# Patient Record
(demographics unavailable — no encounter records)

---

## 2017-12-11 NOTE — PHYS DOC
Past Medical History


Past Medical History:  GERD


Past Surgical History:  Appendectomy, Cholecystectomy, Tonsillectomy, Tubal 

ligation


Alcohol Use:  Occasionally


Drug Use:  None





Adult General


Chief Complaint


Chief Complaint:  UPPER EXTREMITY PAIN





HPI


HPI





Patient is a 42  year old female who presents with right elbow pain. Patient 

states that she does not have any known injury. She does use her elbow at work 

with constant motion. She denies swelling, redness or signs of infection. She 

has taken some ibuprofen and Tylenol but it has not resolved.





Review of Systems


Review of Systems





Constitutional: Denies fever or chills []


Respiratory: Denies cough or shortness of breath []


Cardiovascular: No additional information not addressed in HPI []


Musculoskeletal: Denies back pain or joint pain []


Integument: Denies rash or skin lesions []


Neurologic: Denies headache, focal weakness or sensory changes []


Endocrine: Denies polyuria or polydipsia []





All other systems were reviewed and found to be within normal limits, except as 

documented in this note.





Allergies


Allergies





Allergies








Coded Allergies Type Severity Reaction Last Updated Verified


 


  No Known Drug Allergies    11/30/15 No











Physical Exam


Physical Exam





Constitutional: Well developed, well nourished, no acute distress, non-toxic 

appearance. []


Cardiovascular:Heart rate regular rhythm, no murmur []


Lungs & Thorax:  Bilateral breath sounds clear to auscultation []


Skin: Warm, dry, no erythema, no rash. [] 


Back: No tenderness, no CVA tenderness. [] 


Extremities: Tenderness to right elbow, no cyanosis, no clubbing, ROM slightly 

limited due to pain to extension only, no edema, cyanosis and sensation are 

intact distally. [] 


Neurologic: Alert and oriented X 3, normal motor function, normal sensory 

function, no focal deficits noted. []


Psychologic: Affect normal, judgement normal, mood normal. []





Current Patient Data


Vital Signs





 Vital Signs








  Date Time  Temp Pulse Resp B/P (MAP) Pulse Ox O2 Delivery O2 Flow Rate FiO2


 


17 08:16 97.8 87 18  97 Room Air  





 97.8       











EKG


EKG


[]





Radiology/Procedures


Radiology/Procedures


[]PATIENT: SIGRID ZIMMERMAN ACCOUNT: [id number] MRN#: [id number]


: 1975 LOCATION: ER AGE: 42


SEX: F EXAM DT: 17 ACCESSION#: [id number]


STATUS: REG ER ORD. PHYSICIAN: OLIVERIO SILVERMAN 


REASON: pain x 1 week, no known injury


PROCEDURE: ELBOW RIGHT 3V





Indication: Elbow pain for one week, no known injury.





Technique: 3 views of the right elbow are submitted for review. No comparison


is available.





Findings: There is no fracture or dislocation. There is no displacement of fat


pads/joint effusion. There is no soft tissue swelling.





Impression: Negative for fracture. If further workup is required or if the


concern is for impingement, consider MRI.














DICTATED and SIGNED BY:     TEZ MARTIN MD


DATE:     17 0836





CC: OLIVERIO SILVERMAN; NO PCP; NON,STAFF ~





Course & Med Decision Making


Course & Med Decision Making


Pertinent Labs and Imaging studies reviewed. (See chart for details)





[]1. Elbow pain








Patient is been placed in an Ace wrap. She was given instructions for 

epicondylitis. She has been placed on a prescription for Mobitz. She is to 

follow-up with PCP of her own choosing, return to the ED if worsening.





Dragon Disclaimer


Dragon Disclaimer


This electronic medical record was generated, in whole or in part, using a 

voice recognition dictation system.





Departure


Departure


Impression:  


 Primary Impression:  


 Right elbow pain


Disposition:  01 HOME, SELF-CARE


Condition:  STABLE


Patient Instructions:  Epicondylitis, Lateral (Tennis Elbow) with Rehab-

Sports[facility]





Additional Instructions:  


Please choose a primary care provider from the flyer provided to make an 

appointment for a follow-up in one week. Take the medication as directed. 

Please return to the ED if worsening.


Scripts


Meloxicam (MOBIC) 7.5 Mg Tablet


1 TAB PO DAILY, #30 TAB 1 Refill


   Prov: OLIVERIO SILVERMAN         17











OLIVERIO SILVERMAN Dec 11, 2017 09:16

## 2017-12-11 NOTE — RAD
Indication: Elbow pain for one week, no known injury.



Technique: 3 views of the right elbow are submitted for review. No comparison

is available.



Findings: There is no fracture or dislocation. There is no displacement of fat

pads/joint effusion. There is no soft tissue swelling.



Impression: Negative for fracture. If further workup is required or if the

concern is for impingement, consider MRI.

## 2018-12-13 NOTE — PHYS DOC
Past Medical History


Past Medical History:  GERD


Past Surgical History:  Appendectomy, Cholecystectomy, Tonsillectomy, Tubal 

ligation


Alcohol Use:  Occasionally


Drug Use:  None





Adult General


Chief Complaint


Chief Complaint:  CHEST PAIN





HPI


HPI





Patient is a 43  year old female who presents with chest pain.  Patient has 

been having chest pain over the last 2 weeks. The pain is been constant but has 

waxed and waned in intensity. She states the pain is somewhat improved when she 

lies flat. She has mild shortness of breath associated with the pain. Pain is 

nonradiating. She does not have a personal history of coronary artery disease 

but does have significant family history including in her father, older brother

, and younger brother. The patient has a 10-pack-year history of tobacco abuse. 

She denies prior history of respiratory or lung diagnoses. She came to the 

emergency department today because the pain became more severe and lasted 

longer compared to last 2 weeks.





Review of Systems


Review of Systems





Constitutional: Denies fever or chills []


Eyes: Denies change in visual acuity, redness, or eye pain []


HENT: Denies nasal congestion or sore throat []


Respiratory: Denies cough or shortness of breath []


Cardiovascular: No additional information not addressed in HPI []


GI: Denies abdominal pain, nausea, vomiting, bloody stools or diarrhea []


: Denies dysuria or hematuria []


Musculoskeletal: Denies back pain or joint pain []


Integument: Denies rash or skin lesions []


Neurologic: Denies headache, focal weakness or sensory changes []


Endocrine: Denies polyuria or polydipsia []





All other systems were reviewed and found to be within normal limits, except as 

documented in this note.





Current Medications


Current Medications





Current Medications








 Medications


  (Trade)  Dose


 Ordered  Sig/Alli  Start Time


 Stop Time Status Last Admin


Dose Admin


 


 Albuterol/


 Ipratropium


  (Duoneb)  3 ml  STK-MED ONCE  12/13/18 20:11


 12/13/18 20:13 DC  





 


 Aspirin


  (Children'S


 Aspirin)  324 mg  1X  ONCE  12/13/18 19:00


 12/13/18 19:01 DC 12/13/18 19:06


324 MG


 


 Morphine Sulfate


  (Morphine


 Sulfate)  4 mg  1X  ONCE  12/13/18 19:00


 12/13/18 19:01 DC 12/13/18 19:06


4 MG


 


 Prednisone


  (Prednisone)  60 mg  1X  ONCE  12/13/18 20:15


 12/13/18 20:16 DC 12/13/18 20:18


60 MG











Allergies


Allergies





Allergies








Coded Allergies Type Severity Reaction Last Updated Verified


 


  No Known Drug Allergies    12/13/18 No











Physical Exam


Physical Exam





Constitutional: Well developed, well nourished, no acute distress, non-toxic 

appearance


HENT: Normocephalic, atraumatic, bilateral external ears normal, oropharynx 

moist


Eyes: PERRLA, EOMI, conjunctiva normal 


Neck: Normal range of motion 


Cardiovascular:Heart rate regular rhythm, no murmur


Lungs & Thorax:  Bilateral breath sounds with wheezes bilaterally and mildly 

prolonged expiratory phase


Abdomen: Bowel sounds normal, soft, no tenderness 


Skin: Warm, dry 


Back: No tenderness 


Extremities: No tenderness, no edema 


Neurologic: Alert and oriented X 3


Psychologic: Affect normal





Current Patient Data


Vital Signs





 Vital Signs








  Date Time  Temp Pulse Resp B/P (MAP) Pulse Ox O2 Delivery O2 Flow Rate FiO2


 


12/13/18 20:17     98 Room Air  


 


12/13/18 20:15  64 13 108/51 (70)    


 


12/13/18 18:40 98.5       





 98.5       








Lab Values





 Laboratory Tests








Test


 12/13/18


18:48


 


White Blood Count


 11.7 x10^3/uL


(4.0-11.0)  H


 


Red Blood Count


 4.66 x10^6/uL


(3.50-5.40)


 


Hemoglobin


 13.1 g/dL


(12.0-15.5)


 


Hematocrit


 38.5 %


(36.0-47.0)


 


Mean Corpuscular Volume


 83 fL ()





 


Mean Corpuscular Hemoglobin 28 pg (25-35)  


 


Mean Corpuscular Hemoglobin


Concent 34 g/dL


(31-37)


 


Red Cell Distribution Width


 16.2 %


(11.5-14.5)  H


 


Platelet Count


 251 x10^3/uL


(140-400)


 


Neutrophils (%) (Auto) 69 % (31-73)  


 


Lymphocytes (%) (Auto) 20 % (24-48)  L


 


Monocytes (%) (Auto) 7 % (0-9)  


 


Eosinophils (%) (Auto) 3 % (0-3)  


 


Basophils (%) (Auto) 1 % (0-3)  


 


Neutrophils # (Auto)


 8.1 x10^3uL


(1.8-7.7)  H


 


Lymphocytes # (Auto)


 2.4 x10^3/uL


(1.0-4.8)


 


Monocytes # (Auto)


 0.8 x10^3/uL


(0.0-1.1)


 


Eosinophils # (Auto)


 0.4 x10^3/uL


(0.0-0.7)


 


Basophils # (Auto)


 0.1 x10^3/uL


(0.0-0.2)


 


Prothrombin Time


 13.1 SEC


(11.7-14.0)


 


Prothrombin Time INR 1.0 (0.8-1.1)  


 


PTT


 31 SEC (24-38)





 


D-Dimer (Cierra)


 < 0.27


ug/mlFEU


 


Sodium Level


 140 mmol/L


(136-145)


 


Potassium Level


 3.5 mmol/L


(3.5-5.1)


 


Chloride Level


 101 mmol/L


()


 


Carbon Dioxide Level


 29 mmol/L


(21-32)


 


Anion Gap 10 (6-14)  


 


Blood Urea Nitrogen


 9 mg/dL (7-20)





 


Creatinine


 0.9 mg/dL


(0.6-1.0)


 


Estimated GFR


(Cockcroft-Gault) 68.3  





 


Glucose Level


 215 mg/dL


(70-99)  H


 


Calcium Level


 9.3 mg/dL


(8.5-10.1)


 


Troponin I Quantitative


 < 0.017 ng/mL


(0.000-0.055)


 


NT-Pro-B-Type Natriuretic


Peptide 68 pg/mL


(0-124)





 Laboratory Tests


12/13/18 18:48








 Laboratory Tests


12/13/18 18:48














EKG


EKG


No STEMI


Interpretation Time:


18:45





Radiology/Procedures


Radiology/Procedures


CXR:  No acute findings.





Course & Med Decision Making


Course & Med Decision Making


Pertinent Labs and Imaging studies reviewed. (See chart for details)





Patient is seen and examined immediately on arrival to her room. She is 

currently having mild chest pain symptoms. She is noted to have wheezes in all 

fields during the exam but no increased work of breathing. DuoNeb is ordered 

along with cardiac workup.





HEART:


- slightly suspicious (0)


- EKG normal:  (0)


-  obesity, smoker, family hx, (2)


- troponin normal limit:  (0)





Total score:  2.





20:40:  Patient is re-examined.  Pain sx improved.  Duoneb pending. 





20:50:  Lungs are clear. Patient is feeling improved. Plan is for discharge 

home. She has been having pain for 3 weeks. Her heart score is only 2. She is 

provided the follow-up information for cardiology locally and advised follow-up 

as needed or return to the ER for any new or worsening symptoms.





Dragon Disclaimer


Dragon Disclaimer


This electronic medical record was generated, in whole or in part, using a 

voice recognition dictation system.





Departure


Departure


Disposition:  01 HOME, SELF-CARE


Condition:  GOOD


Referrals:  


NO PCP (PCP)


Scripts


Albuterol Sulfate (PROAIR HFA INHALER) 8.5 Gm Hfa.aer.ad


2 PUFF INH Q4H PRN for SHORTNESS OF BREATH, #1 INHALER 0 Refills


   Prov: CHRIS WESLEY DO         12/13/18 


Prednisone (PREDNISONE) 50 Mg Tablet


1 TAB PO DAILY, #5 TAB


   Prov: CHRIS WESLEY DO         12/13/18











CHRIS WESLEY DO Dec 13, 2018 20:11

## 2018-12-13 NOTE — RAD
PORTABLE CHEST 1V

 

History: Mid sternal chest pain radiating to the right arm and neck, 

hypertension

 

Comparison: None.

 

Findings:

There is no lobar infiltrate, pleural fluid, pneumothorax. Heart size is 

within normal limits given technique.

 

Impression: 

 

1.  No acute radiographic abnormality is identified.

 

Electronically signed by: Seth Hagen MD (12/13/2018 7:09 PM) [facility]

## 2018-12-14 NOTE — EKG
[facility]

              [address] [place] [address]

Test Date:    2018               Test Time:    18:43:15

Pat Name:     SIGRID ZIMMERMAN            Department:   

Patient ID:   [id number]           Room:          

Gender:       F                        Technician:   

:          1975               Requested By: CHRIS WESLEY

Order Number: [id number]           Reading MD:     

                                 Measurements

Intervals                              Axis          

Rate:         96                       P:            43

SC:           178                      QRS:          65

QRSD:         78                       T:            18

QT:           332                                    

QTc:          426                                    

                           Interpretive Statements

SINUS RHYTHM

NORMAL ECG

RI6.01

No previous ECG available for comparison

## 2020-02-19 NOTE — RAD
PROCEDURE: FOOT RIGHT 3V

 

STUDY DATE: 2/19/20

 

CLINICAL INDICATION / HISTORY: pain.

 

TECHNIQUE: AP, lateral and oblique views of the right foot.

 

COMPARISON: none

 

FINDINGS: No fracture or dislocation is identified. The bone density is 

normal. The joint space widths are maintained, and there are no erosions 

to suggest an inflammatory arthropathy. No soft tissue abnormality is 

seen.

 

IMPRESSION: No acute osseous abnormality.

 

Electronically signed by: Troy Billingsley MD (2/19/2020 7:13 PM) 

[id number]

## 2020-02-19 NOTE — PHYS DOC
Past Medical History


Past Medical History:  GERD


 (MINDI ASTORGA)


Past Surgical History:  Appendectomy, Cholecystectomy, Tonsillectomy, Tubal 

ligation


 (MINDI ASTORGA)


Smoking Status:  Current Every Day Smoker


Alcohol Use:  Occasionally


Drug Use:  None


 (MINDI ASTORGA)


Attending Signature


I have participated in the care of this patient and I have reviewed and agree 

with all pertinent clinical information above including history, exam, and 

recommendations.





 (ROBERT HECK MD)





Adult General


Chief Complaint


Chief Complaint:  FOOT INJURY PAIN





HPI


HPI





Patient is a 44  year old female, accompanied by a friend, who presents to the 

emergency department with complaints of swelling and pain in her right foot or 

the last 3 days. Patient denies any known injury, she denies any recent fall or 

twisting of the extremity. She reports that the pain increases with 

weightbearing. Patient states that she stands on her feet all day at work. She 

reports that she has been able to bear weight, however that increases her pain. 

She denies any numbness, tingling, or weakness of the affected extremity. She 

denies any pain at rest. She states that the pain shoots to 8 out of 10 when she

stands, the only thing that has helped the pain is to not bear weight. 


 (MINDI ASTORGA)





Review of Systems


Review of Systems


Complete ROS is negative unless otherwise noted in HPI.


 (MINDI ASTORGA)





Allergies


Allergies





Allergies








Coded Allergies Type Severity Reaction Last Updated Verified


 


  No Known Drug Allergies    12/13/18 No





 (ROBERT HECK MD)





Physical Exam


Physical Exam


See Above


Constitutional: Well developed, well nourished, no acute distress, non-toxic 

appearance. []


HENT: Normocephalic, atraumatic, bilateral external ears normal, nose normal. []


Eyes: PERRLA, EOMI, conjunctiva normal, no discharge. [] 


Neck: Normal range of motion, no stridor. [] 


Cardiovascular:Heart rate regular rhythm


Lungs & Thorax:  Respirations even and unlabored, no retractions, no respiratory

 distress


Skin: Warm, dry, no erythema, no rash. 


Extremities: RLE: No crepitus, R posterior foot TTP, no obvious deformity, no 

cyanosis, no clubbing, ROM intact, no edema, PMS intact. [] 


Neurologic: Alert and oriented X 3, no focal deficits noted. []


Psychologic: Affect normal, judgement normal, mood normal. []


 (MINDI ASTORGA)





Current Patient Data


Vital Signs





                                   Vital Signs








  Date Time  Temp Pulse Resp B/P (MAP) Pulse Ox O2 Delivery O2 Flow Rate FiO2


 


2/19/20 18:31 98.3 81 18 168/89 (115) 96 Room Air  





 98.3       





 (ROBERT HECK MD)





EKG


EKG


[]


 (MINDI ASTORGA)





Radiology/Procedures


Radiology/Procedures


PROCEDURE: FOOT RIGHT 3V





PROCEDURE: FOOT RIGHT 3V


 


STUDY DATE: 2/19/20


 


CLINICAL INDICATION / HISTORY: pain.


 


TECHNIQUE: AP, lateral and oblique views of the right foot.


 


COMPARISON: none


 


FINDINGS: No fracture or dislocation is identified. The bone density is 


normal. The joint space widths are maintained, and there are no erosions 


to suggest an inflammatory arthropathy. No soft tissue abnormality is 


seen.


 


IMPRESSION: No acute osseous abnormality.[]


 (MINDI ASTORGA)





Course & Med Decision Making


Course & Med Decision Making


Pertinent Labs and Imaging studies reviewed. (See chart for details)





[]


 (MINDI ASTORGA)





Dragon Disclaimer


Dragon Disclaimer


This electronic medical record was generated, in whole or in part, using a voice

 recognition dictation system.


 (MINDI ASTORGA)





Departure


Departure


Impression:  


   Primary Impression:  


   Acute pain of right foot


Disposition:  01 HOME, SELF-CARE


Condition:  STABLE


Referrals:  


NO PCP (PCP)


Patient Instructions:  Foot Sprain-Brief





Additional Instructions:  


Tylenol or ibuprofen as needed for pain. Recommend application of ice, 

elevation, and rest of affected extremity. Wear the ace wrap that was applied as

 needed for comfort. Follow up with your primary care doctor if symptoms 

persist, return to the ER if your symptoms worsen.





Splinting


Splinting :  


   Location:  R ankle/foot


   Pre-Made Type:  ace wrap


   Pre-Proc Neuro Vasc Exam:  normal


   Post-Proc Neuro Vasc Exam:  normal, unchanged from pre-exam


 (MINDI ASTORGA)











MINDI ASTORGA       Feb 19, 2020 19:22


ROBERT HECK MD              Feb 21, 2020 05:36